# Patient Record
Sex: FEMALE | Race: WHITE
[De-identification: names, ages, dates, MRNs, and addresses within clinical notes are randomized per-mention and may not be internally consistent; named-entity substitution may affect disease eponyms.]

---

## 2020-07-21 ENCOUNTER — HOSPITAL ENCOUNTER (INPATIENT)
Dept: HOSPITAL 53 - M OR | Age: 35
Discharge: HOME | DRG: 743 | End: 2020-07-21
Attending: GENERAL PRACTICE | Admitting: GENERAL PRACTICE
Payer: COMMERCIAL

## 2020-07-21 VITALS — DIASTOLIC BLOOD PRESSURE: 66 MMHG | SYSTOLIC BLOOD PRESSURE: 107 MMHG

## 2020-07-21 VITALS — SYSTOLIC BLOOD PRESSURE: 111 MMHG | DIASTOLIC BLOOD PRESSURE: 72 MMHG

## 2020-07-21 VITALS — SYSTOLIC BLOOD PRESSURE: 112 MMHG | DIASTOLIC BLOOD PRESSURE: 73 MMHG

## 2020-07-21 VITALS — HEIGHT: 64 IN | BODY MASS INDEX: 25.4 KG/M2 | WEIGHT: 148.8 LBS

## 2020-07-21 VITALS — SYSTOLIC BLOOD PRESSURE: 110 MMHG | DIASTOLIC BLOOD PRESSURE: 72 MMHG

## 2020-07-21 DIAGNOSIS — Z79.899: ICD-10-CM

## 2020-07-21 DIAGNOSIS — F32.9: ICD-10-CM

## 2020-07-21 DIAGNOSIS — Z88.0: ICD-10-CM

## 2020-07-21 DIAGNOSIS — I73.00: ICD-10-CM

## 2020-07-21 DIAGNOSIS — N83.12: ICD-10-CM

## 2020-07-21 DIAGNOSIS — F41.9: ICD-10-CM

## 2020-07-21 DIAGNOSIS — N92.0: Primary | ICD-10-CM

## 2020-07-21 LAB
HCT VFR BLD AUTO: 43.3 % (ref 36–47)
HGB BLD-MCNC: 14.7 G/DL (ref 12–15.5)
MCH RBC QN AUTO: 30.9 PG (ref 27–33)
MCHC RBC AUTO-ENTMCNC: 33.9 G/DL (ref 32–36.5)
MCV RBC AUTO: 91 FL (ref 80–96)
PLATELET # BLD AUTO: 213 10^3/UL (ref 150–450)
RBC # BLD AUTO: 4.76 10^6/UL (ref 4–5.4)
WBC # BLD AUTO: 9.6 10^3/UL (ref 4–10)

## 2020-07-21 PROCEDURE — 0UB14ZZ EXCISION OF LEFT OVARY, PERCUTANEOUS ENDOSCOPIC APPROACH: ICD-10-PCS | Performed by: GENERAL PRACTICE

## 2020-07-21 PROCEDURE — 0UTC4ZZ RESECTION OF CERVIX, PERCUTANEOUS ENDOSCOPIC APPROACH: ICD-10-PCS | Performed by: GENERAL PRACTICE

## 2020-07-21 PROCEDURE — 0UT94ZZ RESECTION OF UTERUS, PERCUTANEOUS ENDOSCOPIC APPROACH: ICD-10-PCS | Performed by: GENERAL PRACTICE

## 2020-07-21 PROCEDURE — 0TJB8ZZ INSPECTION OF BLADDER, VIA NATURAL OR ARTIFICIAL OPENING ENDOSCOPIC: ICD-10-PCS | Performed by: GENERAL PRACTICE

## 2020-07-21 NOTE — POST-OPPD
Postoperative Procedure Note


Date Of Procedure:  Jul 21, 2020


PREOPERATIVE DIAGNOSIS: 


1. Abnormal uterine bleeding


2. left ovarian cyst





POSTOPERATIVE DIAGNOSIS:


sourav





FINDINGS: normal appearing uterus and right ovary.  left ovary with small blood 

filled ovarian cyst.  Bilateral fallopian tube s/p tubal occlusion.  





PROCEDURE: 


1. Total laparoscopic hysterectomy


2. Left ovarian cystectomy


3. Cystoscopy





SURGEON: Marilou Dumont DO





ASSISTANT: Nacho Arcos MD





ANESTHESIA: General





SPECIMENS: uterus and cervix, left ovarian cyst





ESTIMATED BLOOD LOSS: 200cc





REPLACED: 1100cc





DRAINS: 200cc urine





COMPLICATIONS: none





POSTOPERATIVE CONDITION: stable





Detailed Description of procedure:  


The risks, benefits, indicationsand alternatives of the procedure were reviewed 

with the patient and informed consent was obtained.  The patient was taken to 

the operating room with IV running.  Patient induced for general anesthesia.  

Examine under anesthesia reveals anteverted uterus, palpable adnexal mass on 

left side.  Arms tucked, patient placed in lithotomy position.  The abdomen, 

vagina and perineum were prepped and draped in the usual sterile fashion.  


A speculum was placed into the vagina and the cervix identified.  [facility] 

tenaculum grasp anterior cervix.  Medium VCare uterine manipulator placed.  

Justice placed.  Umbilical incision made.  Veres needle used to enter the abdomen.

 Saline drop test verified intra-abdominal placement.   Abdomen insufflate with 

CO2.   Direct entry using 5mm trocar and 5mm scope entered without problem.   

One 5mm port placed on the right lower quadrant under direct visualization.  Two

more 5mm port placed in the left lower quadrant under direct visualization.  

Abdomen was examined found to have no injuries.  Normal appearing uterus.   

Normal appearing right ovary.  Left ovary with blood filled cyst.   Bilateral 

fallopian tubes status post ligation at mid ithmus portion, no abnormal lesion 

in pelvis.  Ligasure device used throughout case for seal and cut.  Left Utero-

Ovarian ligament grasp, coagulate and cut.  Left round ligament grasp, 

coagulated and cut.  Dissection directed caudally to include the broad ligament.

 Anterior broad ligament dissected towards bladder flap.  Uterine pedicles caut

erized.  Steps repeated for right side.  Uterine pedicles ligated.  Bladder flap

created as right anterior broad ligament dissected to meet left side.  Rest of 

broad ligament dissected caudally to vaginal cervical junction.  Vcare cup 

identified and colpotomy created posteriorly using monopolar device.  

Vaginal/cervical junction circumfrentially cut.  Uterus now free to be removed 

from intraabdominal cavity.  Left ovarian cyst Removed.  Uterus, cervix and left

ovarian cyst removed vaginally.  The vaginal cuff closed horizontally with 2-0 

180 V-lock suture.   Evaluation of pelvic using laparoscopic camera shows 

surgical area hemostatic.  Justice removed.  Cystoscopy using 70 degree scope 

revealed normal appearing bladder mucosa with bilateral ureteral jets.  Justice 

replaced after cystoscope.  The pelvis and abdomen reexamined.  Surgical area 

hemostatic.


Gas allowed to escape and abdomen was desuflated.  All ports removed.   Skin 

closed with suture and dermabond


Sponge and instruments count correct x 2.  Patient was taken out of OR in stable

condition.











MARILOU DUMONT DO                  Jul 21, 2020 10:36

## 2020-08-07 ENCOUNTER — HOSPITAL ENCOUNTER (EMERGENCY)
Dept: HOSPITAL 53 - M ED | Age: 35
Discharge: HOME | End: 2020-08-07
Payer: COMMERCIAL

## 2020-08-07 DIAGNOSIS — Z88.0: ICD-10-CM

## 2020-08-07 DIAGNOSIS — F33.9: ICD-10-CM

## 2020-08-07 DIAGNOSIS — F41.9: ICD-10-CM

## 2020-08-07 DIAGNOSIS — N99.820: Primary | ICD-10-CM

## 2020-08-07 DIAGNOSIS — Z88.1: ICD-10-CM

## 2020-08-07 DIAGNOSIS — Z79.899: ICD-10-CM

## 2020-08-07 DIAGNOSIS — I73.00: ICD-10-CM

## 2020-09-20 LAB
BASOPHILS # BLD AUTO: 0.1 10^3/UL (ref 0–0.2)
BASOPHILS NFR BLD AUTO: 0.5 % (ref 0–1)
EOSINOPHIL # BLD AUTO: 0.1 10^3/UL (ref 0–0.5)
EOSINOPHIL NFR BLD AUTO: 1.1 % (ref 0–3)
HCT VFR BLD AUTO: 43.8 % (ref 36–47)
HGB BLD-MCNC: 14.9 G/DL (ref 12–15.5)
LYMPHOCYTES # BLD AUTO: 2.7 10^3/UL (ref 1.5–5)
LYMPHOCYTES NFR BLD AUTO: 26 % (ref 24–44)
MCH RBC QN AUTO: 30.9 PG (ref 27–33)
MCHC RBC AUTO-ENTMCNC: 34 G/DL (ref 32–36.5)
MCV RBC AUTO: 90.9 FL (ref 80–96)
MONOCYTES # BLD AUTO: 0.7 10^3/UL (ref 0–0.8)
MONOCYTES NFR BLD AUTO: 6.8 % (ref 0–5)
NEUTROPHILS # BLD AUTO: 6.8 10^3/UL (ref 1.5–8.5)
NEUTROPHILS NFR BLD AUTO: 65.1 % (ref 36–66)
PLATELET # BLD AUTO: 276 10^3/UL (ref 150–450)
RBC # BLD AUTO: 4.82 10^6/UL (ref 4–5.4)
WBC # BLD AUTO: 10.4 10^3/UL (ref 4–10)

## 2020-10-05 NOTE — HPE
DATE OF ADMISSION:   2020



This lady is a 35-year-old  3, para 3, abortio 1, who has a history of 
anxiety and depression. She had a laparoscopic-assisted vaginal hysterectomy and
cystoscopy 2020. She was sitting in a chair, got up, and a gash of blood 
per vagina. She came in with some moderate amount of old blood with clots. 
Subsequent to that she has had minimal amount of bleeding.



PAST SURGICAL HISTORY:  

1. Laparoscopic tubal.

2. Dilatation and curettage (D and C) for spontaneous .



She is on multiple medications, Lunesta, nifedipine, Celebrex, Xanax, and 
Viibryd. 



Hemodynamically she is stable, 123/60, respirations 15, pulse 82, temperature 
97.4. Hemoglobin 14.9, hematocrit 43.8, platelets are 276, white count 10.3. She
has no pain. On examination, no acute distress. On pelvis examination, the vault
is clean. There is some old blood. There is some irritation affecting the left 
vaginal apex area. There is no actual active pumping bleeding at the present 
time. No tenderness on poking the vaginal wall. The possibility of the left 
angle having popped a suture may be entertained, and she may have had a residual
filling of vagina.



Our plan of management presently is to have her walk around, review her pad in 
half an hour. If there is no evidence of real active bleeding, she will see us 
in the office on Monday. Now that she has the parameters of bleeding, if there 
is any acute pumping she is to come back immediately. Pelvic rest was instilled 
upon her.  Our discharge is pending evaluation of her vagina and her pad in half
and hour's time.

AMY

## 2020-10-26 LAB
ALBUMIN SERPL BCG-MCNC: 4.3 GM/DL (ref 3.2–5.2)
ALT SERPL W P-5'-P-CCNC: 19 U/L (ref 12–78)
BILIRUB CONJ SERPL-MCNC: < 0.1 MG/DL (ref 0–0.2)
BILIRUB SERPL-MCNC: 0.4 MG/DL (ref 0.2–1)
BUN SERPL-MCNC: 18 MG/DL (ref 7–18)
CALCIUM SERPL-MCNC: 9.2 MG/DL (ref 8.5–10.1)
CHLORIDE SERPL-SCNC: 108 MEQ/L (ref 98–107)
CO2 SERPL-SCNC: 27 MEQ/L (ref 21–32)
CREAT SERPL-MCNC: 0.65 MG/DL (ref 0.55–1.3)
GFR SERPL CREATININE-BSD FRML MDRD: > 60 ML/MIN/{1.73_M2} (ref 60–?)
GLUCOSE SERPL-MCNC: 78 MG/DL (ref 70–100)
LIPASE SERPL-CCNC: 109 U/L (ref 73–393)
POTASSIUM SERPL-SCNC: 4.3 MEQ/L (ref 3.5–5.1)
PROT SERPL-MCNC: 7.7 GM/DL (ref 6.4–8.2)
SODIUM SERPL-SCNC: 140 MEQ/L (ref 136–145)

## 2021-01-16 ENCOUNTER — HOSPITAL ENCOUNTER (OUTPATIENT)
Dept: HOSPITAL 53 - M LABSMTC | Age: 36
End: 2021-01-16
Attending: PEDIATRICS
Payer: SELF-PAY

## 2021-01-16 DIAGNOSIS — Z20.822: Primary | ICD-10-CM

## 2021-02-08 ENCOUNTER — HOSPITAL ENCOUNTER (OUTPATIENT)
Dept: HOSPITAL 53 - M SFHCRHEU | Age: 36
End: 2021-02-08
Attending: INTERNAL MEDICINE
Payer: COMMERCIAL

## 2021-02-08 DIAGNOSIS — R76.8: Primary | ICD-10-CM

## 2021-02-08 DIAGNOSIS — H04.123: ICD-10-CM

## 2021-02-08 LAB
C3 SERPL-MCNC: 90 MG/DL (ref 90–180)
C4 SERPL-MCNC: 13 MG/DL (ref 10–40)
THYROPEROXIDASE AB SERPL IA-ACNC: 31.2 U/ML (ref ?–60)

## 2021-02-09 LAB
ENA SS-A AB SER IA-ACNC: <0.2 AI (ref 0–0.9)
ENA SS-B AB SER IA-ACNC: <0.2 AI (ref 0–0.9)